# Patient Record
Sex: MALE | Race: WHITE | NOT HISPANIC OR LATINO | Employment: FULL TIME | URBAN - METROPOLITAN AREA
[De-identification: names, ages, dates, MRNs, and addresses within clinical notes are randomized per-mention and may not be internally consistent; named-entity substitution may affect disease eponyms.]

---

## 2019-04-02 ENCOUNTER — OFFICE VISIT (OUTPATIENT)
Dept: FAMILY MEDICINE CLINIC | Facility: CLINIC | Age: 54
End: 2019-04-02

## 2019-04-02 VITALS
TEMPERATURE: 98.4 F | OXYGEN SATURATION: 96 % | SYSTOLIC BLOOD PRESSURE: 126 MMHG | HEART RATE: 121 BPM | DIASTOLIC BLOOD PRESSURE: 88 MMHG | WEIGHT: 296 LBS | BODY MASS INDEX: 40.09 KG/M2 | HEIGHT: 72 IN

## 2019-04-02 DIAGNOSIS — J02.9 ACUTE PHARYNGITIS, UNSPECIFIED ETIOLOGY: ICD-10-CM

## 2019-04-02 DIAGNOSIS — J98.01 BRONCHOSPASM: ICD-10-CM

## 2019-04-02 DIAGNOSIS — J18.9 PNEUMONITIS: Primary | ICD-10-CM

## 2019-04-02 PROCEDURE — 99214 OFFICE O/P EST MOD 30 MIN: CPT | Performed by: INTERNAL MEDICINE

## 2019-04-02 PROCEDURE — 71046 X-RAY EXAM CHEST 2 VIEWS: CPT | Performed by: INTERNAL MEDICINE

## 2019-04-02 RX ORDER — ALBUTEROL SULFATE 90 UG/1
AEROSOL, METERED RESPIRATORY (INHALATION)
COMMUNITY
Start: 2019-03-29

## 2019-04-02 RX ORDER — BUDESONIDE AND FORMOTEROL FUMARATE DIHYDRATE 160; 4.5 UG/1; UG/1
AEROSOL RESPIRATORY (INHALATION)
Qty: 1 INHALER | Refills: 0 | Status: SHIPPED | OUTPATIENT
Start: 2019-04-02

## 2019-04-02 RX ORDER — BUDESONIDE AND FORMOTEROL FUMARATE DIHYDRATE 160; 4.5 UG/1; UG/1
2 AEROSOL RESPIRATORY (INHALATION)
Qty: 1 G | Refills: 12 | Status: SHIPPED | OUTPATIENT
Start: 2019-04-02

## 2019-04-02 RX ORDER — DEXTROMETHORPHAN HYDROBROMIDE AND PROMETHAZINE HYDROCHLORIDE 15; 6.25 MG/5ML; MG/5ML
SYRUP ORAL
Refills: 0 | COMMUNITY
Start: 2019-03-29

## 2019-04-02 RX ORDER — LEVOCETIRIZINE DIHYDROCHLORIDE 5 MG/1
5 TABLET, FILM COATED ORAL EVERY EVENING
COMMUNITY

## 2019-04-02 RX ORDER — PREDNISONE 10 MG/1
10 TABLET ORAL DAILY
Qty: 20 TABLET | Refills: 0 | Status: SHIPPED | OUTPATIENT
Start: 2019-04-02

## 2019-04-02 RX ORDER — DOXYCYCLINE HYCLATE 100 MG/1
CAPSULE ORAL
Refills: 0 | COMMUNITY
Start: 2019-03-29

## 2019-04-02 NOTE — PROGRESS NOTES
Subjective   Mike Suárez is a 54 y.o. male.   Follow-up with probable pneumonia seen in urgent care actually l [facility] without chest x-ray capability.  By auscultation they were concerned about pneumonia and sent him here for further follow-up  History of Present Illness   As above did receive doxycycline clinically is improved at this point energy level is improving no temperature today has not had a chest x-ray hold which will do patient does have an element of wheezing as well.  Some cough not that bothersome for sleep.  He did receive an albuterol inhaler at the [facility] clinic no personal history pneumonia energy is improved from 2 days ago not aware of a temperature.  Very minimal scratchy throat.  No sinus drainage  Patient is a former smoker no contributory family history drug allergies are none  Review of Systems   Respiratory: Positive for cough.    All other systems reviewed and are negative.      Objective   Vitals:    04/02/19 1318   BP: 126/88   Pulse: (!) 121   Temp: 98.4 °F (36.9 °C)   SpO2: 96%   Weight: 134 kg (296 lb)     Physical Exam   Constitutional: He appears well-developed and well-nourished.   HENT:   Head: Normocephalic and atraumatic.   Right Ear: External ear normal.   Left Ear: External ear normal.   Throat with mild inflammation   Cardiovascular: Normal rate, regular rhythm and normal heart sounds.   Pulmonary/Chest: Effort normal.   Patient with mild to moderate wheezes bilaterally few rhonchi heard lower lung fields right more so than left partial clearing with cough.   Abdominal: Soft. Bowel sounds are normal.   Neurological: He is alert.   Unremarkable gait and station   Skin: Skin is warm and dry.   Nursing note and vitals reviewed.      No results found for: INR    Procedures  Peak flows are diminished chest x-ray AP and lateral obtain we have either small infiltrate or air bronchograms on right lower lung field seen almost touching the hemidiaphragm on the right on the PA  view and what appears to be right lower lung field on the lateral view.  Nothing regarding infiltrates on the left.  No other bony or soft tissue normalities are noted.  No comparison available.  Assessment/Plan   1.  Pneumonitis right plan will get formal radiology reading patient is clinically improving doxycycline will continue same for 10-day course follow-up in 10 days time    2.  Bronchospasm plan prednisone 40 mg Ã-2 days, 30 mg Ã-2 days, 20 mg Ã-2 days, 10 mg Ã-2 days.,  Symbicort 162 puffs twice daily patient is let us know this should worsen or go to ER    3.  Pharyngitis continue with doxycycline as we see no clinical response to this point time to complete a 10-day course    Much of this encounter note is an electronic transcription/translation of spoken language to printed text.  The electronic translation of spoken language may permit erroneous, or at times, nonsensical words or phrases to be inadvertently transcribed.  Although I have reviewed the note for such errors, some may still exist. If there are questions or for further clarification, please contact me.

## 2023-04-14 ENCOUNTER — OFFICE VISIT (OUTPATIENT)
Dept: INTERNAL MEDICINE | Facility: CLINIC | Age: 58
End: 2023-04-14
Payer: COMMERCIAL

## 2023-04-14 VITALS
WEIGHT: 292 LBS | OXYGEN SATURATION: 96 % | SYSTOLIC BLOOD PRESSURE: 156 MMHG | HEIGHT: 72 IN | DIASTOLIC BLOOD PRESSURE: 100 MMHG | HEART RATE: 100 BPM | BODY MASS INDEX: 39.55 KG/M2

## 2023-04-14 DIAGNOSIS — Z12.5 SCREENING FOR PROSTATE CANCER: ICD-10-CM

## 2023-04-14 DIAGNOSIS — I10 HYPERTENSION, UNSPECIFIED TYPE: ICD-10-CM

## 2023-04-14 DIAGNOSIS — Z00.00 ENCOUNTER FOR MEDICAL EXAMINATION TO ESTABLISH CARE: ICD-10-CM

## 2023-04-14 DIAGNOSIS — Z12.11 SCREEN FOR COLON CANCER: Primary | ICD-10-CM

## 2023-04-14 DIAGNOSIS — Z11.59 ENCOUNTER FOR HEPATITIS C SCREENING TEST FOR LOW RISK PATIENT: ICD-10-CM

## 2023-04-14 NOTE — PROGRESS NOTES
"Chief Complaint  No chief complaint on file.    Subjective        Mike Suárez presents to [facility]  History of Present Illness  58-year-old male presenting with hypertension and to establish care.  Is a  for [employer].  Both daughters are still at home and age 25 and 22.  Activity includes work and walking dogs at home.  Previously had a carcinoma removed from his leg.  Gallbladder removed in his 40s and has no issues since.  Colonoscopy previously with Dr. Keenan.  Has had rotator cuff surgery in the past.  Cyst has also been removed from his back in the past.  Mother has hypertension.            Objective   Vital Signs:  /100   Pulse 100   Ht 182.2 cm (71.75\")   Wt 132 kg (292 lb)   SpO2 96%   BMI 39.88 kg/m²   Estimated body mass index is 39.88 kg/m² as calculated from the following:    Height as of this encounter: 182.2 cm (71.75\").    Weight as of this encounter: 132 kg (292 lb).             Physical Exam  Vitals reviewed.   Constitutional:       Appearance: Normal appearance.   Cardiovascular:      Rate and Rhythm: Normal rate and regular rhythm.      Pulses: Normal pulses.      Heart sounds: Normal heart sounds.   Pulmonary:      Effort: Pulmonary effort is normal.      Breath sounds: Normal breath sounds.   Musculoskeletal:         General: Normal range of motion.      Cervical back: Normal range of motion.   Skin:     General: Skin is warm and dry.      Capillary Refill: Capillary refill takes less than 2 seconds.   Neurological:      General: No focal deficit present.      Mental Status: He is alert and oriented to person, place, and time.   Psychiatric:         Mood and Affect: Mood normal.         Behavior: Behavior normal.         Thought Content: Thought content normal.         Judgment: Judgment normal.        Result Review :        Current Outpatient Medications on File Prior to Visit   Medication Sig Dispense Refill   • levocetirizine (XYZAL) 5 MG " tablet Take 1 tablet by mouth Every Evening.     • Multiple Vitamin (MULTI VITAMIN DAILY PO) Take 1 tablet by mouth.     • vitamin C (ASCORBIC ACID) 500 MG tablet Take 1 tablet by mouth Daily.     • Lutein-Zeaxanthin 15-4.75 MG capsule Take  by mouth Daily.       No current facility-administered medications on file prior to visit.                    Assessment and Plan   Diagnoses and all orders for this visit:    1. Screen for colon cancer (Primary)  -     Ambulatory Referral For Screening Colonoscopy    2. Encounter for hepatitis C screening test for low risk patient  -     Hepatitis C Antibody; Future    3. Encounter for medical examination to establish care  -     Comprehensive Metabolic Panel; Future  -     Urinalysis With Microscopic If Indicated (No Culture) - Urine, Clean Catch; Future  -     TSH Rfx On Abnormal To Free T4; Future  -     Lipid Panel With / Chol / HDL Ratio; Future  -     CBC & Differential; Future    4. Screening for prostate cancer  -     PSA Screen; Future    5. Hypertension, unspecified type      Patient Instructions   Purchase a blood pressure cuff and monitor daily at home. Record and bring to next appointment. Limit salt intake. Stay hydrated with water. Limit caffeine. Increase physical activity as tolerated. Labs in future. Follow-up in 2 months for annual.           Follow Up   Return in about 2 months (around 6/14/2023) for Annual physical.  Patient was given instructions and counseling regarding his condition or for health maintenance advice. Please see specific information pulled into the AVS if appropriate.

## 2023-04-14 NOTE — PATIENT INSTRUCTIONS
Purchase a blood pressure cuff and monitor daily at home. Record and bring to next appointment. Limit salt intake. Stay hydrated with water. Limit caffeine. Increase physical activity as tolerated. Labs in future. Follow-up in 2 months for annual.

## 2023-04-20 ENCOUNTER — PATIENT ROUNDING (BHMG ONLY) (OUTPATIENT)
Dept: INTERNAL MEDICINE | Facility: CLINIC | Age: 58
End: 2023-04-20
Payer: COMMERCIAL

## 2023-05-03 ENCOUNTER — TELEPHONE (OUTPATIENT)
Dept: GASTROENTEROLOGY | Facility: CLINIC | Age: 58
End: 2023-05-03
Payer: COMMERCIAL

## 2023-05-03 ENCOUNTER — PRE-PROCEDURE SCREENING (OUTPATIENT)
Dept: GASTROENTEROLOGY | Facility: CLINIC | Age: 58
End: 2023-05-03
Payer: COMMERCIAL

## 2023-05-03 NOTE — TELEPHONE ENCOUNTER
Hub staff attempted to follow warm transfer process and was unsuccessful     Caller: Mike Suárez    Relationship to patient: Self    Best call back number: [phone]    Patient is needing: PT RETURNING PHONE CALL FROM    Alana Gibbs RegSched Rep     PT STATES HE DOES NOT KNOW WHEN THE DATE OF HIS LAST SCOPE WAS AS IT HAS BEEN OVER 15 YEARS PLEASE CALL PT BACK

## 2023-05-04 NOTE — TELEPHONE ENCOUNTER
LAST SCOPE 15YRS ( NO RECORDS) --Personal history of polyps--No family  history  of polyps--Family history of colon cancer--No blood thinners--Medications:              levocetirizine (XYZAL) 5 MG tablet  Lutein-Zeaxanthin 15-4.75 MG capsule  Multiple Vitamin (MULTI VITAMIN DAILY PO)  vitamin C (ASCORBIC ACID) 500 MG tablet         QUESTIONNAIRE SCREENING  SCAN IN  MEDIA & HAS BEEN SENT TO DOCTOR FOR REVIEW

## 2023-05-19 ENCOUNTER — TELEPHONE (OUTPATIENT)
Dept: GASTROENTEROLOGY | Facility: CLINIC | Age: 58
End: 2023-05-19

## 2023-05-19 NOTE — TELEPHONE ENCOUNTER
Hub staff attempted to follow warm transfer process and was unsuccessful     Caller: Daphne Suárez    Relationship to patient: Emergency Contact    Best call back number: [phone]    Patient is needing: PATIENT IS CALLING TO SCHEDULE SCOPE. PLEASE CALL BACK AS SOON AS POSSIBLE.  LEAVE VM IF PATIENT DOESN'T ANSWER.

## 2023-05-24 NOTE — TELEPHONE ENCOUNTER
Hub staff attempted to follow warm transfer process and was unsuccessful     Caller: Daphne Suárez    Relationship to patient: Emergency Contact    Best call back number: [phone]    Patient is needing: RETURNING MISSED CALL TO SCHEDULE SCOPE

## 2023-05-29 ENCOUNTER — PREP FOR SURGERY (OUTPATIENT)
Dept: OTHER | Facility: HOSPITAL | Age: 58
End: 2023-05-29

## 2023-05-29 DIAGNOSIS — K63.5 COLON POLYP: Primary | ICD-10-CM

## 2023-06-01 PROBLEM — K63.5 COLON POLYP: Status: ACTIVE | Noted: 2023-06-01

## 2023-10-06 ENCOUNTER — TELEPHONE (OUTPATIENT)
Dept: INTERNAL MEDICINE | Facility: CLINIC | Age: 58
End: 2023-10-06

## 2023-10-06 NOTE — TELEPHONE ENCOUNTER
Caller: Mike Suárez    Relationship: Self    Best call back number: [phone]     What is the best time to reach you: ANY     Who are you requesting to speak with (clinical staff, provider,  specific staff member): JOSEFINA MURILLO       What was the call regarding: PATIENT STATES HE HAS BEEN EXPERIENCING DIARRHEA SINCE SUNDAY MONDAY HE WENT TO WORK AND LEFT WORK EARLY BECAUSE HE WAS ACHY AND CAME HOME AND SLEPT   PATIENT STATES TUESDAY HE WAS BETTER AND WORKED A FULL SHIFT WITH A SMALL AMOUNT OF DIARRHEA NIGHT   PATIENT STATES WEDNESDAY HE  HAD SOME MORE DIARRHEA AND WAS FATIGUE PATIENT STATES ON THURSDAY HE WAS ABLE TO WORK A FULL SHIFT WITH A LITTLE DIARRHEA AND YESTERDAY HE WAS ABLE TO WORK AGAIN BUT THE DIARRHEA GOT WORSE OVER NIGHT    PATIENT STATES HE IS CONCERNED AND WOULD LIKE TO KNOW WHAT JOSEFINA MURILLO ADVISE      Is it okay if the provider responds through MyChart: NO

## 2023-10-06 NOTE — TELEPHONE ENCOUNTER
Recommend staying hydrated with fluids and having occasional Gatorade to help replenish electrolytes.  Recommend eating easily digestible foods such as broth soups, Jell-O, crackers, bananas and applesauce.  Consider taking Imodium when at work or if leaving the home to help prevent diarrhea episodes.  If symptoms persist over the weekend, recommend scheduling a visit for next week.    If severe abdominal pain, blood in stool or inability to tolerate food and fluids occurs, then go to ER.

## 2023-10-10 ENCOUNTER — OFFICE VISIT (OUTPATIENT)
Dept: INTERNAL MEDICINE | Facility: CLINIC | Age: 58
End: 2023-10-10
Payer: COMMERCIAL

## 2023-10-10 VITALS
SYSTOLIC BLOOD PRESSURE: 148 MMHG | WEIGHT: 244.4 LBS | HEART RATE: 80 BPM | OXYGEN SATURATION: 98 % | HEIGHT: 72 IN | BODY MASS INDEX: 33.1 KG/M2 | DIASTOLIC BLOOD PRESSURE: 88 MMHG

## 2023-10-10 DIAGNOSIS — R19.7 DIARRHEA, UNSPECIFIED TYPE: Primary | ICD-10-CM

## 2023-10-10 PROCEDURE — 99213 OFFICE O/P EST LOW 20 MIN: CPT

## 2023-10-10 NOTE — PATIENT INSTRUCTIONS
Continue diet. Add more solid foods at dinner. Stay hydrated with water. If symptoms not improved by Thursday, notify office. Will prescribe Flagyl if symptoms not improved. Follow-up as needed.

## 2023-10-10 NOTE — PROGRESS NOTES
"Chief Complaint  Diarrhea    Subjective        Mike Suárez presents to [facility]  History of Present Illness  58-year-old male presenting with diarrhea.  Symptoms started about 10 days ago.  Has had watery diarrhea multiple times a day.  He is able to sense when he needs to go.  Has avoided any incontinence.  Denies blood in stool.  Denies malodorous stool.  Diarrhea has varied in color.  Is able to tolerate food and liquids.  Denies nausea or vomiting.  Denies fever and abdominal pain.  Has been having mostly liquid diet as well as BRAT diet.  Last night was the first regular meal that he is experienced.  Had baked chicken and cooked vegetables.  Tolerated well.  This morning had a semiloose stool instead of watery stool.  Diarrhea         Objective   Vital Signs:  /88 (BP Location: Left arm, Patient Position: Sitting, Cuff Size: Large Adult)   Pulse 80   Ht 182.2 cm (71.73\")   Wt 111 kg (244 lb 6.4 oz)   SpO2 98%   BMI 33.39 kg/mý   Estimated body mass index is 33.39 kg/mý as calculated from the following:    Height as of this encounter: 182.2 cm (71.73\").    Weight as of this encounter: 111 kg (244 lb 6.4 oz).               Physical Exam  Vitals reviewed.   Constitutional:       Appearance: Normal appearance.   Abdominal:      General: Bowel sounds are normal.      Palpations: Abdomen is soft.   Musculoskeletal:         General: Normal range of motion.      Cervical back: Normal range of motion.   Skin:     General: Skin is warm and dry.      Capillary Refill: Capillary refill takes less than 2 seconds.   Neurological:      General: No focal deficit present.      Mental Status: He is alert and oriented to person, place, and time.   Psychiatric:         Mood and Affect: Mood normal.         Behavior: Behavior normal.         Thought Content: Thought content normal.         Judgment: Judgment normal.        Result Review :    Common labs          6/9/2023    09:00 "   Common Labs   Glucose 96    BUN 14    Creatinine 0.84    Sodium 141    Potassium 4.7    Chloride 103    Calcium 10.3    Total Protein 7.1    Albumin 4.5    Total Bilirubin 0.5    Alkaline Phosphatase 67    AST (SGOT) 19    ALT (SGPT) 52    WBC 4.45    Hemoglobin 16.6    Hematocrit 47.9    Platelets 200    Total Cholesterol 228    Triglycerides 128    HDL Cholesterol 45    LDL Cholesterol  160    PSA 1.030          Current Outpatient Medications on File Prior to Visit   Medication Sig Dispense Refill    levocetirizine (XYZAL) 5 MG tablet Take 1 tablet by mouth Every Evening.      Lutein-Zeaxanthin 15-4.75 MG capsule Take  by mouth Daily.      Multiple Vitamin (MULTI VITAMIN DAILY PO) Take 1 tablet by mouth.      vitamin C (ASCORBIC ACID) 500 MG tablet Take 1 tablet by mouth Daily.       No current facility-administered medications on file prior to visit.              Assessment and Plan   Diagnoses and all orders for this visit:    1. Diarrhea, unspecified type (Primary)      Patient Instructions   Continue diet. Add more solid foods at dinner. Stay hydrated with water. If symptoms not improved by Thursday, notify office. Will prescribe Flagyl if symptoms not improved. Follow-up as needed.          Follow Up   No follow-ups on file.  Patient was given instructions and counseling regarding his condition or for health maintenance advice. Please see specific information pulled into the AVS if appropriate.

## 2023-12-01 ENCOUNTER — ANESTHESIA (OUTPATIENT)
Dept: GASTROENTEROLOGY | Facility: HOSPITAL | Age: 58
End: 2023-12-01
Payer: COMMERCIAL

## 2023-12-01 ENCOUNTER — ANESTHESIA EVENT (OUTPATIENT)
Dept: GASTROENTEROLOGY | Facility: HOSPITAL | Age: 58
End: 2023-12-01
Payer: COMMERCIAL

## 2023-12-01 ENCOUNTER — HOSPITAL ENCOUNTER (OUTPATIENT)
Facility: HOSPITAL | Age: 58
Setting detail: HOSPITAL OUTPATIENT SURGERY
Discharge: HOME | End: 2023-12-01
Attending: INTERNAL MEDICINE | Admitting: INTERNAL MEDICINE
Payer: COMMERCIAL

## 2023-12-01 VITALS
SYSTOLIC BLOOD PRESSURE: 112 MMHG | WEIGHT: 230 LBS | HEIGHT: 72 IN | HEART RATE: 76 BPM | DIASTOLIC BLOOD PRESSURE: 75 MMHG | OXYGEN SATURATION: 100 % | BODY MASS INDEX: 31.15 KG/M2 | RESPIRATION RATE: 16 BRPM

## 2023-12-01 DIAGNOSIS — K63.5 COLON POLYP: ICD-10-CM

## 2023-12-01 PROCEDURE — 25010000002 PROPOFOL 10 MG/ML EMULSION: Performed by: ANESTHESIOLOGY

## 2023-12-01 PROCEDURE — 45385 COLONOSCOPY W/LESION REMOVAL: CPT | Performed by: INTERNAL MEDICINE

## 2023-12-01 PROCEDURE — 45380 COLONOSCOPY AND BIOPSY: CPT | Performed by: INTERNAL MEDICINE

## 2023-12-01 PROCEDURE — 25810000003 LACTATED RINGERS PER 1000 ML: Performed by: INTERNAL MEDICINE

## 2023-12-01 PROCEDURE — 88305 TISSUE EXAM BY PATHOLOGIST: CPT | Performed by: INTERNAL MEDICINE

## 2023-12-01 RX ORDER — PROPOFOL 10 MG/ML
VIAL (ML) INTRAVENOUS CONTINUOUS PRN
Status: DISCONTINUED | OUTPATIENT
Start: 2023-12-01 | End: 2023-12-01

## 2023-12-01 RX ORDER — PROPOFOL 10 MG/ML
VIAL (ML) INTRAVENOUS AS NEEDED
Status: DISCONTINUED | OUTPATIENT
Start: 2023-12-01 | End: 2023-12-01 | Stop reason: SURG

## 2023-12-01 RX ORDER — LIDOCAINE HYDROCHLORIDE 20 MG/ML
INJECTION, SOLUTION INFILTRATION; PERINEURAL AS NEEDED
Status: DISCONTINUED | OUTPATIENT
Start: 2023-12-01 | End: 2023-12-01 | Stop reason: SURG

## 2023-12-01 RX ORDER — SODIUM CHLORIDE, SODIUM LACTATE, POTASSIUM CHLORIDE, CALCIUM CHLORIDE 600; 310; 30; 20 MG/100ML; MG/100ML; MG/100ML; MG/100ML
30 INJECTION, SOLUTION INTRAVENOUS CONTINUOUS PRN
Status: DISCONTINUED | OUTPATIENT
Start: 2023-12-01 | End: 2023-12-01 | Stop reason: HOSPADM

## 2023-12-01 RX ADMIN — PROPOFOL 200 MG: 10 INJECTION, EMULSION INTRAVENOUS at 08:30

## 2023-12-01 RX ADMIN — SODIUM CHLORIDE, POTASSIUM CHLORIDE, SODIUM LACTATE AND CALCIUM CHLORIDE 30 ML/HR: 600; 310; 30; 20 INJECTION, SOLUTION INTRAVENOUS at 08:24

## 2023-12-01 RX ADMIN — PROPOFOL 200 MCG/KG/MIN: 10 INJECTION, EMULSION INTRAVENOUS at 08:34

## 2023-12-01 RX ADMIN — LIDOCAINE HYDROCHLORIDE 100 MG: 20 INJECTION, SOLUTION INFILTRATION; PERINEURAL at 08:31

## 2023-12-01 NOTE — DISCHARGE INSTRUCTIONS
For the next 24 hours patient needs to be with a responsible adult.    For 24 hours DO NOT drive, operate machinery, appliances, drink alcohol, make important decisions or sign legal documents.    Start with a light or bland diet if you are feeling sick to your stomach otherwise advance to regular diet as tolerated.    Follow recommendations on procedure report if provided by your doctor.    Call Dr Keenan for problems [phone]    Problems may include but not limited to: large amounts of bleeding, trouble breathing, repeated vomiting, severe unrelieved pain, fever or chills.

## 2023-12-01 NOTE — ANESTHESIA POSTPROCEDURE EVALUATION
"Patient: Mike Suárez    Procedure Summary       Date: 12/01/23 Room / Location:  [facility]    Anesthesia Start: 0826 Anesthesia Stop: 0855    Procedure: COLONOSCOPY to cecum with cold biopsy polypectomies Diagnosis:       Colon polyp      (Colon polyp [K63.5])    Surgeons: Mike Keenan MD Provider: Umang Garcia MD    Anesthesia Type: MAC ASA Status: 2            Anesthesia Type: MAC    Vitals  Vitals Value Taken Time   /75 12/01/23 0914   Temp     Pulse 75 12/01/23 0914   Resp 16 12/01/23 0913   SpO2 97 % 12/01/23 0914   Vitals shown include unfiled device data.        Post Anesthesia Care and Evaluation    Patient location during evaluation: bedside  Patient participation: complete - patient participated  Level of consciousness: sleepy but conscious  Pain score: 0  Pain management: adequate    Airway patency: patent  Anesthetic complications: No anesthetic complications    Cardiovascular status: acceptable  Respiratory status: acceptable  Hydration status: acceptable    Comments: /75 (BP Location: Right arm, Patient Position: Lying)   Pulse 76   Resp 16   Ht 182.9 cm (72\")   Wt 104 kg (230 lb)   SpO2 100%   BMI 31.19 kg/m²       "

## 2023-12-01 NOTE — H&P
"[facility]  Pre Procedure History & Physical    Chief Complaint:   Time for my colonoscopy    Subjective     HPI:   58 y.o. male here for a screening colonoscopy.  He does have a history of colon polyps taken out many years ago.  His last colonoscopy was many years ago.    Past Medical History:   Past Medical History:   Diagnosis Date    Cancer     Basal cell carcinoma, lower left leg    Cholelithiasis     Colon polyp     MRSA (methicillin resistant Staphylococcus aureus) carrier     Pneumonia        Family History:  Family History   Problem Relation Age of Onset    Heart disease Maternal Grandfather        Social History:   reports that he quit smoking about 32 years ago. His smoking use included cigarettes. He started smoking about 37 years ago. He has a 1.25 pack-year smoking history. He has been exposed to tobacco smoke. He has never used smokeless tobacco. He reports current alcohol use of about 6.0 standard drinks of alcohol per week. He reports that he does not use drugs.    Medications:   Medications Prior to Admission   Medication Sig Dispense Refill Last Dose    levocetirizine (XYZAL) 5 MG tablet Take 1 tablet by mouth Every Evening.   Past Week    Lutein-Zeaxanthin 15-4.75 MG capsule Take  by mouth Daily.   Past Week    Multiple Vitamin (MULTI VITAMIN DAILY PO) Take 1 tablet by mouth.   Past Week    vitamin C (ASCORBIC ACID) 500 MG tablet Take 1 tablet by mouth Daily.   Past Week       Allergies:  Patient has no known allergies.    ROS:    Pertinent items are noted in HPI     Objective     Blood pressure 151/100, pulse 98, resp. rate 16, height 182.9 cm (72\"), weight 104 kg (230 lb), SpO2 97%.    Physical Exam   Constitutional: Pt is oriented to person, place, and time and well-developed, well-nourished, and in no distress.   HENT:   Mouth/Throat: Oropharynx is clear and moist.   Neck: Normal range of motion. Neck supple.   Cardiovascular: Normal rate, regular rhythm and normal " heart sounds.    Pulmonary/Chest: Effort normal and breath sounds normal. No respiratory distress. No  wheezes.   Abdominal: Soft. Bowel sounds are normal.   Skin: Skin is warm and dry.   Psychiatric: Mood, memory, affect and judgment normal.     Assessment & Plan     Diagnosis:  58 y.o. male here for a screening colonoscopy.  He does have a history of colon polyps taken out many years ago.  His last colonoscopy was many years ago.    Anticipated Surgical Procedure:  Colonoscopy    The risks, benefits, and alternatives of this procedure have been discussed with the patient or the responsible party- the patient understands and agrees to proceed.    Mike Keenan M.D.

## 2023-12-01 NOTE — ANESTHESIA PREPROCEDURE EVALUATION
Anesthesia Evaluation     Patient summary reviewed and Nursing notes reviewed   NPO Solid Status: > 8 hours  NPO Liquid Status: > 4 hours           Airway   Mallampati: II  Neck ROM: full  No difficulty expected  Dental - normal exam     Pulmonary     breath sounds clear to auscultation  (+) a smoker Former,  Cardiovascular         Neuro/Psych  GI/Hepatic/Renal/Endo      Musculoskeletal     Abdominal    Substance History      OB/GYN          Other      history of cancer                      Anesthesia Plan    ASA 2     MAC     intravenous induction     Anesthetic plan, risks, benefits, and alternatives have been provided, discussed and informed consent has been obtained with: patient.        CODE STATUS:

## 2023-12-04 LAB
LAB AP CASE REPORT: NORMAL
PATH REPORT.FINAL DX SPEC: NORMAL
PATH REPORT.GROSS SPEC: NORMAL

## 2023-12-11 ENCOUNTER — TELEPHONE (OUTPATIENT)
Dept: GASTROENTEROLOGY | Facility: CLINIC | Age: 58
End: 2023-12-11
Payer: COMMERCIAL

## 2023-12-11 NOTE — TELEPHONE ENCOUNTER
Called pt and left  for pt to call back.     C/s placed in [facility] and  for 12/01/2028.    Results sent to pcp thru [facility].

## 2023-12-11 NOTE — TELEPHONE ENCOUNTER
----- Message from Mike Keenan MD sent at 12/10/2023  9:08 PM EST -----  12/10/23       Tell him that the colon polyps that were removed were not cancerous but at least one polyp was precancerous. I recommend a repeat colonoscopy in 5 yrs. Send a copy of this report to his PCP.   Thx. kjh

## 2023-12-11 NOTE — PROGRESS NOTES
12/10/23       Tell him that the colon polyps that were removed were not cancerous but at least one polyp was precancerous. I recommend a repeat colonoscopy in 5 yrs. Send a copy of this report to his PCP.   Ela jin

## 2023-12-11 NOTE — TELEPHONE ENCOUNTER
Hub staff attempted to follow warm transfer process and was unsuccessful     Caller: Mike Suárez    Relationship to patient: Self    Best call back number: [phone]     Patient is needing: PT IS RETURNING PHONE CALL FROM JARRELL PIZANO RN IN REGARDS TO RESULTS. PLEASE CALL PT BACK AND ADVISE

## 2023-12-28 ENCOUNTER — OFFICE VISIT (OUTPATIENT)
Dept: INTERNAL MEDICINE | Facility: CLINIC | Age: 58
End: 2023-12-28
Payer: COMMERCIAL

## 2023-12-28 VITALS
SYSTOLIC BLOOD PRESSURE: 134 MMHG | HEIGHT: 72 IN | OXYGEN SATURATION: 98 % | DIASTOLIC BLOOD PRESSURE: 80 MMHG | BODY MASS INDEX: 31.29 KG/M2 | WEIGHT: 231 LBS | HEART RATE: 84 BPM

## 2023-12-28 DIAGNOSIS — M25.50 ARTHRALGIA, UNSPECIFIED JOINT: Primary | ICD-10-CM

## 2023-12-28 DIAGNOSIS — I10 HYPERTENSION, UNSPECIFIED TYPE: ICD-10-CM

## 2023-12-28 DIAGNOSIS — E78.5 HYPERLIPIDEMIA, UNSPECIFIED HYPERLIPIDEMIA TYPE: ICD-10-CM

## 2023-12-28 LAB
ALBUMIN SERPL-MCNC: 5 G/DL (ref 3.5–5.2)
ALBUMIN/GLOB SERPL: 2.2 G/DL
ALP SERPL-CCNC: 71 U/L (ref 39–117)
ALT SERPL-CCNC: 21 U/L (ref 1–41)
AST SERPL-CCNC: 14 U/L (ref 1–40)
BILIRUB SERPL-MCNC: 0.6 MG/DL (ref 0–1.2)
BUN SERPL-MCNC: 12 MG/DL (ref 6–20)
BUN/CREAT SERPL: 13.6 (ref 7–25)
CALCIUM SERPL-MCNC: 9.7 MG/DL (ref 8.6–10.5)
CHLORIDE SERPL-SCNC: 101 MMOL/L (ref 98–107)
CHOLEST SERPL-MCNC: 224 MG/DL (ref 0–200)
CHOLEST/HDLC SERPL: 3.29 {RATIO}
CO2 SERPL-SCNC: 29.6 MMOL/L (ref 22–29)
CREAT SERPL-MCNC: 0.88 MG/DL (ref 0.76–1.27)
EGFRCR SERPLBLD CKD-EPI 2021: 99.1 ML/MIN/1.73
ERYTHROCYTE [DISTWIDTH] IN BLOOD BY AUTOMATED COUNT: 12.4 % (ref 12.3–15.4)
GLOBULIN SER CALC-MCNC: 2.3 GM/DL
GLUCOSE SERPL-MCNC: 92 MG/DL (ref 65–99)
HCT VFR BLD AUTO: 45.1 % (ref 37.5–51)
HDLC SERPL-MCNC: 68 MG/DL (ref 40–60)
HGB BLD-MCNC: 15.4 G/DL (ref 13–17.7)
LDLC SERPL CALC-MCNC: 143 MG/DL (ref 0–100)
MCH RBC QN AUTO: 31.4 PG (ref 26.6–33)
MCHC RBC AUTO-ENTMCNC: 34.1 G/DL (ref 31.5–35.7)
MCV RBC AUTO: 91.9 FL (ref 79–97)
PLATELET # BLD AUTO: 213 10*3/MM3 (ref 140–450)
POTASSIUM SERPL-SCNC: 4.8 MMOL/L (ref 3.5–5.2)
PROT SERPL-MCNC: 7.3 G/DL (ref 6–8.5)
RBC # BLD AUTO: 4.91 10*6/MM3 (ref 4.14–5.8)
SODIUM SERPL-SCNC: 142 MMOL/L (ref 136–145)
TRIGL SERPL-MCNC: 74 MG/DL (ref 0–150)
VLDLC SERPL CALC-MCNC: 13 MG/DL (ref 5–40)
WBC # BLD AUTO: 5.52 10*3/MM3 (ref 3.4–10.8)

## 2023-12-28 PROCEDURE — 99214 OFFICE O/P EST MOD 30 MIN: CPT

## 2023-12-28 RX ORDER — CALCIUM POLYCARBOPHIL 625 MG
TABLET ORAL
COMMUNITY

## 2023-12-28 RX ORDER — AMOXICILLIN 500 MG
CAPSULE ORAL
COMMUNITY

## 2023-12-28 RX ORDER — DICLOFENAC POTASSIUM 50 MG/1
50 TABLET, FILM COATED ORAL DAILY PRN
Qty: 90 TABLET | Refills: 1 | Status: SHIPPED | OUTPATIENT
Start: 2023-12-28

## 2023-12-28 NOTE — PATIENT INSTRUCTIONS
Take diclofenac daily as needed for joint pain. Stay hydrated with water. Stay active. Labs today. Follow-up in 6 months for annual and fasting labs.

## 2023-12-28 NOTE — PROGRESS NOTES
"Chief Complaint  Follow-up    Subjective        Mike Suárez presents to [facility]  History of Present Illness  58-year-old male presenting for hypertension hyperlipidemia follow-up.  Recent colonoscopy shows precancerous polyps.  Recommendation of colonoscopy in 5 years.  Has been experiencing neck ache and upper shoulder pain.  Also has problems with his thumbs.  Thinks it could be arthritis.  Has been seen by our physical therapy but has not improved.  States he is improved his diet by eliminating salt and caffeine since April 2023.      Objective   Vital Signs:  /80 (BP Location: Right arm, Patient Position: Sitting, Cuff Size: Adult)   Pulse 84   Ht 182.9 cm (72\")   Wt 105 kg (231 lb)   SpO2 98%   BMI 31.33 kg/m²   Estimated body mass index is 31.33 kg/m² as calculated from the following:    Height as of this encounter: 182.9 cm (72\").    Weight as of this encounter: 105 kg (231 lb).               Physical Exam  Vitals reviewed.   Constitutional:       Appearance: Normal appearance.   Musculoskeletal:         General: Normal range of motion.      Cervical back: Normal range of motion. Tenderness present.   Skin:     General: Skin is warm and dry.      Capillary Refill: Capillary refill takes less than 2 seconds.   Neurological:      General: No focal deficit present.      Mental Status: He is alert and oriented to person, place, and time.   Psychiatric:         Mood and Affect: Mood normal.         Behavior: Behavior normal.         Thought Content: Thought content normal.         Judgment: Judgment normal.        Result Review :    Common labs          6/9/2023    09:00 12/28/2023    13:39   Common Labs   Glucose 96  92    BUN 14  12    Creatinine 0.84  0.88    Sodium 141  142    Potassium 4.7  4.8    Chloride 103  101    Calcium 10.3  9.7    Total Protein 7.1  7.3    Albumin 4.5  5.0    Total Bilirubin 0.5  0.6    Alkaline Phosphatase 67  71    AST (SGOT) 19  14  "   ALT (SGPT) 52  21    WBC 4.45  5.52    Hemoglobin 16.6  15.4    Hematocrit 47.9  45.1    Platelets 200  213    Total Cholesterol 228  224    Triglycerides 128  74    HDL Cholesterol 45  68    LDL Cholesterol  160  143    PSA 1.030           Current Outpatient Medications on File Prior to Visit   Medication Sig Dispense Refill    levocetirizine (XYZAL) 5 MG tablet Take 1 tablet by mouth Every Evening.      Lutein-Zeaxanthin 15-4.75 MG capsule Take  by mouth Daily.      Multiple Vitamin (MULTI VITAMIN DAILY PO) Take 1 tablet by mouth.      Omega-3 Fatty Acids (fish oil) 1200 MG capsule capsule       polycarbophil (calcium polycarbophil) 625 MG tablet tablet       vitamin C (ASCORBIC ACID) 500 MG tablet Take 1 tablet by mouth Daily.       No current facility-administered medications on file prior to visit.              Assessment and Plan   Diagnoses and all orders for this visit:    1. Arthralgia, unspecified joint (Primary)  -     diclofenac (CATAFLAM) 50 MG tablet; Take 1 tablet by mouth Daily As Needed (mild pain).  Dispense: 90 tablet; Refill: 1    2. Hypertension, unspecified type  -     Comprehensive Metabolic Panel  -     CBC (No Diff)  -     Lipid Panel With / Chol / HDL Ratio    3. Hyperlipidemia, unspecified hyperlipidemia type  -     Lipid Panel With / Chol / HDL Ratio      Patient Instructions   Take diclofenac daily as needed for joint pain. Stay hydrated with water. Stay active. Labs today. Follow-up in 6 months for annual and fasting labs.          Follow Up   Return in about 6 months (around 6/28/2024) for Annual physical.  Patient was given instructions and counseling regarding his condition or for health maintenance advice. Please see specific information pulled into the AVS if appropriate.

## 2024-01-02 NOTE — PROGRESS NOTES
CBC is within normal limits.  No signs of infection or anemia.  Metabolic panel is unremarkable.  No signs of kidney injury, liver injury or diabetes.  LDL (bad cholesterol) is elevated 143.  This is an improvement from 6 months ago.  Previous level 160.  Goal of 100 or lower.  Recommend continuing omega-3 (fish oil) supplement daily.  Limit fried foods, red meats and high fat dairy products.  Stay hydrated with water.  Increase heart elevating.  Activity as tolerated

## 2024-06-28 ENCOUNTER — OFFICE VISIT (OUTPATIENT)
Dept: INTERNAL MEDICINE | Facility: CLINIC | Age: 59
End: 2024-06-28
Payer: COMMERCIAL

## 2024-06-28 VITALS
DIASTOLIC BLOOD PRESSURE: 106 MMHG | HEART RATE: 73 BPM | SYSTOLIC BLOOD PRESSURE: 146 MMHG | BODY MASS INDEX: 32.1 KG/M2 | HEIGHT: 72 IN | WEIGHT: 237 LBS | TEMPERATURE: 98.6 F | OXYGEN SATURATION: 96 %

## 2024-06-28 DIAGNOSIS — Z12.5 SCREENING FOR MALIGNANT NEOPLASM OF PROSTATE: Primary | ICD-10-CM

## 2024-06-28 DIAGNOSIS — Z00.00 ANNUAL PHYSICAL EXAM: ICD-10-CM

## 2024-06-28 LAB
ALBUMIN SERPL-MCNC: 4.9 G/DL (ref 3.5–5.2)
ALBUMIN/GLOB SERPL: 2 G/DL
ALP SERPL-CCNC: 61 U/L (ref 39–117)
ALT SERPL-CCNC: 18 U/L (ref 1–41)
APPEARANCE UR: CLEAR
AST SERPL-CCNC: 18 U/L (ref 1–40)
BACTERIA #/AREA URNS HPF: ABNORMAL /HPF
BASOPHILS # BLD AUTO: 0.05 10*3/MM3 (ref 0–0.2)
BASOPHILS NFR BLD AUTO: 0.9 % (ref 0–1.5)
BILIRUB SERPL-MCNC: 0.6 MG/DL (ref 0–1.2)
BILIRUB UR QL STRIP: NEGATIVE
BUN SERPL-MCNC: 11 MG/DL (ref 8–23)
BUN/CREAT SERPL: 12.5 (ref 7–25)
CALCIUM SERPL-MCNC: 9.8 MG/DL (ref 8.6–10.5)
CASTS URNS MICRO: ABNORMAL
CHLORIDE SERPL-SCNC: 102 MMOL/L (ref 98–107)
CHOLEST SERPL-MCNC: 239 MG/DL (ref 0–200)
CHOLEST/HDLC SERPL: 3.23 {RATIO}
CO2 SERPL-SCNC: 29.9 MMOL/L (ref 22–29)
COLOR UR: YELLOW
CREAT SERPL-MCNC: 0.88 MG/DL (ref 0.76–1.27)
EGFRCR SERPLBLD CKD-EPI 2021: 98.4 ML/MIN/1.73
EOSINOPHIL # BLD AUTO: 0.08 10*3/MM3 (ref 0–0.4)
EOSINOPHIL NFR BLD AUTO: 1.5 % (ref 0.3–6.2)
EPI CELLS #/AREA URNS HPF: ABNORMAL /HPF
ERYTHROCYTE [DISTWIDTH] IN BLOOD BY AUTOMATED COUNT: 11.7 % (ref 12.3–15.4)
GLOBULIN SER CALC-MCNC: 2.5 GM/DL
GLUCOSE SERPL-MCNC: 100 MG/DL (ref 65–99)
GLUCOSE UR QL STRIP: NEGATIVE
HCT VFR BLD AUTO: 48.4 % (ref 37.5–51)
HDLC SERPL-MCNC: 74 MG/DL (ref 40–60)
HGB BLD-MCNC: 16.6 G/DL (ref 13–17.7)
HGB UR QL STRIP: NEGATIVE
IMM GRANULOCYTES # BLD AUTO: 0.01 10*3/MM3 (ref 0–0.05)
IMM GRANULOCYTES NFR BLD AUTO: 0.2 % (ref 0–0.5)
KETONES UR QL STRIP: ABNORMAL
LDLC SERPL CALC-MCNC: 153 MG/DL (ref 0–100)
LEUKOCYTE ESTERASE UR QL STRIP: ABNORMAL
LYMPHOCYTES # BLD AUTO: 1.77 10*3/MM3 (ref 0.7–3.1)
LYMPHOCYTES NFR BLD AUTO: 33.3 % (ref 19.6–45.3)
MCH RBC QN AUTO: 31.6 PG (ref 26.6–33)
MCHC RBC AUTO-ENTMCNC: 34.3 G/DL (ref 31.5–35.7)
MCV RBC AUTO: 92.2 FL (ref 79–97)
MONOCYTES # BLD AUTO: 0.53 10*3/MM3 (ref 0.1–0.9)
MONOCYTES NFR BLD AUTO: 10 % (ref 5–12)
NEUTROPHILS # BLD AUTO: 2.87 10*3/MM3 (ref 1.7–7)
NEUTROPHILS NFR BLD AUTO: 54.1 % (ref 42.7–76)
NITRITE UR QL STRIP: NEGATIVE
NRBC BLD AUTO-RTO: 0 /100 WBC (ref 0–0.2)
PH UR STRIP: 5.5 [PH] (ref 5–8)
PLATELET # BLD AUTO: 217 10*3/MM3 (ref 140–450)
POTASSIUM SERPL-SCNC: 4.4 MMOL/L (ref 3.5–5.2)
PROT SERPL-MCNC: 7.4 G/DL (ref 6–8.5)
PROT UR QL STRIP: NEGATIVE
PSA SERPL-MCNC: 1.1 NG/ML (ref 0–4)
RBC # BLD AUTO: 5.25 10*6/MM3 (ref 4.14–5.8)
RBC #/AREA URNS HPF: ABNORMAL /HPF
SODIUM SERPL-SCNC: 143 MMOL/L (ref 136–145)
SP GR UR STRIP: 1.02 (ref 1–1.03)
TRIGL SERPL-MCNC: 71 MG/DL (ref 0–150)
TSH SERPL DL<=0.005 MIU/L-ACNC: 1.45 UIU/ML (ref 0.27–4.2)
UROBILINOGEN UR STRIP-MCNC: ABNORMAL MG/DL
VLDLC SERPL CALC-MCNC: 12 MG/DL (ref 5–40)
WBC # BLD AUTO: 5.31 10*3/MM3 (ref 3.4–10.8)
WBC #/AREA URNS HPF: ABNORMAL /HPF

## 2024-06-28 PROCEDURE — 99396 PREV VISIT EST AGE 40-64: CPT

## 2024-06-28 NOTE — PROGRESS NOTES
"Chief Complaint  Annual Exam    Subjective        Mike Suárez presents to [facility]  History of Present Illness  59-year-old male presenting for annual exam.  Has recently returned from a long weekend outside of [place] to visit his wife's family.  Diet and exercise have been about the same.  Frequently walks but is unable to perform weight lifting exercises due to shoulder conditions.  Has had repair of his right shoulder and has decreased range of motion of the left shoulder.  Also has bilateral knee and ankle issues.  Blood pressure at home prior to the visit was 122/79.  Frequently has whitecoat syndrome.  Denies headaches, chest pain, palpitations, difficulty breathing, shortness of breath, vision changes.    Sees dermatology annually due to previous basal cell removal from leg.  Has also had some precancerous spots on his face removed.      Objective   Vital Signs:  BP (!) 146/106 (BP Location: Right arm, Patient Position: Sitting, Cuff Size: Large Adult)   Pulse 73   Temp 98.6 °F (37 °C)   Ht 182.9 cm (72\")   Wt 108 kg (237 lb)   SpO2 96%   BMI 32.14 kg/m²   Estimated body mass index is 32.14 kg/m² as calculated from the following:    Height as of this encounter: 182.9 cm (72\").    Weight as of this encounter: 108 kg (237 lb).       BMI is >= 30 and <35. (Class 1 Obesity). The following options were offered after discussion;: exercise counseling/recommendations and nutrition counseling/recommendations      Physical Exam  Vitals reviewed.   Constitutional:       Appearance: Normal appearance.   HENT:      Head: Normocephalic.      Right Ear: Tympanic membrane normal.      Left Ear: Tympanic membrane normal.      Nose: Nose normal.      Mouth/Throat:      Mouth: Mucous membranes are moist.      Pharynx: Oropharynx is clear.   Eyes:      Pupils: Pupils are equal, round, and reactive to light.   Cardiovascular:      Rate and Rhythm: Normal rate.      Pulses: Normal " pulses.      Heart sounds: Normal heart sounds.   Pulmonary:      Effort: Pulmonary effort is normal.      Breath sounds: Normal breath sounds.   Abdominal:      General: Bowel sounds are normal.      Palpations: Abdomen is soft.   Musculoskeletal:         General: Normal range of motion.      Cervical back: Normal range of motion.   Skin:     General: Skin is warm and dry.      Capillary Refill: Capillary refill takes less than 2 seconds.   Neurological:      General: No focal deficit present.      Mental Status: He is alert and oriented to person, place, and time.   Psychiatric:         Mood and Affect: Mood normal.         Behavior: Behavior normal.         Thought Content: Thought content normal.         Judgment: Judgment normal.        Result Review :      Common labs          12/28/2023    13:39   Common Labs   Glucose 92    BUN 12    Creatinine 0.88    Sodium 142    Potassium 4.8    Chloride 101    Calcium 9.7    Total Protein 7.3    Albumin 5.0    Total Bilirubin 0.6    Alkaline Phosphatase 71    AST (SGOT) 14    ALT (SGPT) 21    WBC 5.52    Hemoglobin 15.4    Hematocrit 45.1    Platelets 213    Total Cholesterol 224    Triglycerides 74    HDL Cholesterol 68    LDL Cholesterol  143          Current Outpatient Medications on File Prior to Visit   Medication Sig Dispense Refill    diclofenac (CATAFLAM) 50 MG tablet Take 1 tablet by mouth Daily As Needed (mild pain). 90 tablet 1    levocetirizine (XYZAL) 5 MG tablet Take 1 tablet by mouth Every Evening.      Lutein-Zeaxanthin 15-4.75 MG capsule Take  by mouth Daily.      Multiple Vitamin (MULTI VITAMIN DAILY PO) Take 1 tablet by mouth.      Omega-3 Fatty Acids (fish oil) 1200 MG capsule capsule       polycarbophil (calcium polycarbophil) 625 MG tablet tablet       vitamin C (ASCORBIC ACID) 500 MG tablet Take 1 tablet by mouth Daily.       No current facility-administered medications on file prior to visit.                Assessment and Plan     Diagnoses and  all orders for this visit:    1. Screening for malignant neoplasm of prostate (Primary)  -     PSA Screen    2. Annual physical exam  -     Comprehensive Metabolic Panel  -     Urinalysis With Microscopic If Indicated (No Culture) - Urine, Clean Catch  -     Lipid Panel With / Chol / HDL Ratio  -     TSH Rfx On Abnormal To Free T4  -     CBC & Differential        Patient Instructions   Continue medications as prescribed. Recommend food journal for a week to make diet changes. Consider body weight exercises. Stay hydrated with water. Wear sunscreen. Labs today. Results to determine if statin to be started. Follow-up in 6 months for recheck and fasting labs.            Follow Up     Return in about 6 months (around 12/28/2024) for Recheck.  Patient was given instructions and counseling regarding his condition or for health maintenance advice. Please see specific information pulled into the AVS if appropriate.

## 2024-06-28 NOTE — PATIENT INSTRUCTIONS
Continue medications as prescribed. Recommend food journal for a week to make diet changes. Consider body weight exercises. Stay hydrated with water. Wear sunscreen. Labs today. Results to determine if statin to be started. Follow-up in 6 months for recheck and fasting labs.

## 2024-07-01 NOTE — PROGRESS NOTES
PSA screening is normal.    No signs of thyroid disease.    CBC is unremarkable.  No signs of infection or anemia.    Urinalysis unremarkable.  Stay hydrated with water to help maintain overall kidney health.    No signs of liver injury, kidney injury or electrolyte imbalance.  Recommend staying active and continuing to limit sweets and carbohydrates.    LDL (bad cholesterol) has increased slightly to 153.  Previous level 143. Recommend limiting intake of red meat, pork, fried foods and high fat dairy products. Limit intake of alcohol. Recommend at least 30 minutes of heart elevating exercises three days a week as tolerated.  Recommend starting atorvastatin 20 mg daily to help lower cholesterol levels.  If agreeable, prescription be sent to pharmacy.

## 2024-07-05 ENCOUNTER — TELEPHONE (OUTPATIENT)
Dept: INTERNAL MEDICINE | Facility: CLINIC | Age: 59
End: 2024-07-05
Payer: COMMERCIAL

## 2024-07-05 RX ORDER — ATORVASTATIN CALCIUM 20 MG/1
20 TABLET, FILM COATED ORAL DAILY
Qty: 90 TABLET | Refills: 0 | Status: SHIPPED | OUTPATIENT
Start: 2024-07-05

## 2024-07-05 NOTE — TELEPHONE ENCOUNTER
PCP is out of office and I am on-call provider. As covering provider I am refilling medication for patient's PCP.  Further refills need to be directed to patient's primary care provider.  1 refill granted at this time until seen by PCP  Thank you,  JOSEFINA Zimmer      Caller: Mike Suárez    Relationship: Self    Best call back number:     [phone] (Mobile)     What is the best time to reach you: ANYTIME, ASAP    Who are you requesting to speak with (clinical staff, provider,  specific staff member): CLINICAL STAFF/ NILSON ROCHA    Do you know the name of the person who called: Mike Suárez    What was the call regarding: PATIENT CALLED BACK REGARDING THE CALL HE RECEIVED TO ASK IF HE WAS AGREEABLE TO START TAKING A STATIN MEDICATION FOR HIS HIGH CHOLESTEROL LEVELS, PATIENT STATES HE IS AGREEABLE TO TAKING THE STATIN MEDICATION ATORVASTATIN 20MG AND WOULD LIKE IT SENT TO THE PHARMACY ASAP, HUB CONFIRMED PATIENT HAS NO PREVIOUS ALLERGIES OR NEGATIVE REACTIONS TO STATINS AND HAS NEVER TAKEN A STATIN BEFORE    [facility] [id number] [place] [id number] [phone] FX     PLEASE ADVISE PATIENT WHEN SENT TO PHARMACY ASAP    Is it okay if the provider responds through MyChart: PLEASE CALL PATIENT BACK REGARDING THIS ASAP

## 2024-07-08 ENCOUNTER — PATIENT MESSAGE (OUTPATIENT)
Dept: INTERNAL MEDICINE | Facility: CLINIC | Age: 59
End: 2024-07-08
Payer: COMMERCIAL

## 2024-07-12 NOTE — TELEPHONE ENCOUNTER
From: Mike Suárez  To: Robert Tiwari  Sent: 7/8/2024 12:15 PM EDT  Subject: Statin prescription     I have begun taking the prescribed statin, but am unsure whether to continue taking the fish oil supplement that I have been taking previously. I don’t think we discussed that or whether to begin taking a Co Q10 as well as the statin. Thanks!

## 2024-10-02 RX ORDER — ATORVASTATIN CALCIUM 20 MG/1
20 TABLET, FILM COATED ORAL DAILY
Qty: 90 TABLET | Refills: 0 | Status: SHIPPED | OUTPATIENT
Start: 2024-10-02

## 2024-12-31 RX ORDER — ATORVASTATIN CALCIUM 20 MG/1
20 TABLET, FILM COATED ORAL DAILY
Qty: 90 TABLET | Refills: 0 | Status: SHIPPED | OUTPATIENT
Start: 2024-12-31

## 2025-02-04 ENCOUNTER — TELEPHONE (OUTPATIENT)
Dept: INTERNAL MEDICINE | Facility: CLINIC | Age: 60
End: 2025-02-04

## 2025-02-04 NOTE — TELEPHONE ENCOUNTER
Hub staff attempted to follow warm transfer process and was unsuccessful     Caller: Mike Suárez    Relationship to patient: Self    Best call back number: [phone]     Patient is needing: TO RESCHEDULE PHYSICAL APPOINTMENT DUE TO THE LAST 2 APPOINTMENTS WERE CANCELLED BY PROVIDER.     PLEASE CALL TO ADVISE.

## 2025-02-04 NOTE — TELEPHONE ENCOUNTER
Called patient back to get his 6 month follow up rescheduled     Hub okay to reschedule patient with judith

## 2025-03-06 ENCOUNTER — OFFICE VISIT (OUTPATIENT)
Dept: INTERNAL MEDICINE | Facility: CLINIC | Age: 60
End: 2025-03-06
Payer: COMMERCIAL

## 2025-03-06 VITALS
HEIGHT: 72 IN | DIASTOLIC BLOOD PRESSURE: 87 MMHG | HEART RATE: 80 BPM | SYSTOLIC BLOOD PRESSURE: 139 MMHG | BODY MASS INDEX: 32.56 KG/M2 | WEIGHT: 240.4 LBS | TEMPERATURE: 98.2 F | OXYGEN SATURATION: 97 %

## 2025-03-06 DIAGNOSIS — G89.29 CHRONIC LEFT SHOULDER PAIN: ICD-10-CM

## 2025-03-06 DIAGNOSIS — E78.5 HYPERLIPIDEMIA, UNSPECIFIED HYPERLIPIDEMIA TYPE: Primary | ICD-10-CM

## 2025-03-06 DIAGNOSIS — I10 HYPERTENSION, UNSPECIFIED TYPE: ICD-10-CM

## 2025-03-06 DIAGNOSIS — M25.512 CHRONIC LEFT SHOULDER PAIN: ICD-10-CM

## 2025-03-06 LAB
ALBUMIN SERPL-MCNC: 4.6 G/DL (ref 3.5–5.2)
ALBUMIN/GLOB SERPL: 1.8 G/DL
ALP SERPL-CCNC: 80 U/L (ref 39–117)
ALT SERPL-CCNC: 21 U/L (ref 1–41)
APPEARANCE UR: CLEAR
AST SERPL-CCNC: 19 U/L (ref 1–40)
BILIRUB SERPL-MCNC: 0.5 MG/DL (ref 0–1.2)
BILIRUB UR QL STRIP: NEGATIVE
BUN SERPL-MCNC: 15 MG/DL (ref 8–23)
BUN/CREAT SERPL: 17 (ref 7–25)
CALCIUM SERPL-MCNC: 9.7 MG/DL (ref 8.6–10.5)
CHLORIDE SERPL-SCNC: 102 MMOL/L (ref 98–107)
CHOLEST SERPL-MCNC: 163 MG/DL (ref 0–200)
CHOLEST/HDLC SERPL: 2.36 {RATIO}
CO2 SERPL-SCNC: 26.6 MMOL/L (ref 22–29)
COLOR UR: YELLOW
CREAT SERPL-MCNC: 0.88 MG/DL (ref 0.76–1.27)
EGFRCR SERPLBLD CKD-EPI 2021: 98.4 ML/MIN/1.73
GLOBULIN SER CALC-MCNC: 2.5 GM/DL
GLUCOSE SERPL-MCNC: 106 MG/DL (ref 65–99)
GLUCOSE UR QL STRIP: NEGATIVE
HDLC SERPL-MCNC: 69 MG/DL (ref 40–60)
HGB UR QL STRIP: NEGATIVE
KETONES UR QL STRIP: NEGATIVE
LDLC SERPL CALC-MCNC: 79 MG/DL (ref 0–100)
LEUKOCYTE ESTERASE UR QL STRIP: NEGATIVE
NITRITE UR QL STRIP: NEGATIVE
PH UR STRIP: 6 [PH] (ref 5–8)
POTASSIUM SERPL-SCNC: 5 MMOL/L (ref 3.5–5.2)
PROT SERPL-MCNC: 7.1 G/DL (ref 6–8.5)
PROT UR QL STRIP: NEGATIVE
SODIUM SERPL-SCNC: 141 MMOL/L (ref 136–145)
SP GR UR STRIP: 1.01 (ref 1–1.03)
TRIGL SERPL-MCNC: 78 MG/DL (ref 0–150)
UROBILINOGEN UR STRIP-MCNC: NORMAL MG/DL
VLDLC SERPL CALC-MCNC: 15 MG/DL (ref 5–40)

## 2025-03-06 PROCEDURE — 99213 OFFICE O/P EST LOW 20 MIN: CPT

## 2025-03-06 RX ORDER — ATORVASTATIN CALCIUM 20 MG/1
20 TABLET, FILM COATED ORAL DAILY
Qty: 90 TABLET | Refills: 1 | Status: SHIPPED | OUTPATIENT
Start: 2025-03-06

## 2025-03-06 NOTE — PATIENT INSTRUCTIONS
Continue medications as prescribed. Referral to Dr. Juan ordered. Scheduling center to call with appointment. Labs today. Follow-up in 6 months for annual and fasting labs.

## 2025-03-06 NOTE — PROGRESS NOTES
"Chief Complaint  Med Refill    Subjective        Mike Suárez presents to [facility]  History of Present Illness  59-year-old male presenting for left shoulder pain, hyperlipidemia and hypertension.  Has been taking atorvastatin 20 mg daily.  Tolerating well.  Has gained some weight due to snacking during sporting events.  Is enjoying [place] basketball this season.    Has chronic left shoulder issues.  His range of motion is decreased.  States he does get sharp stabbing pains if he over reaches.  This has been going on for years and has had injections in the past by Dr. Roche at [facility].  Had his right shoulder replaced by Dr. Roche at [facility] multiple years ago.  Has not seen him for multiple years and new referral is needed.      Is considering entering into a program called [employer] through his employer that may assist with surgery benefits.  Will assist in any ways necessary.    Has not been regularly checking his blood pressure at home.  Denies chest pain, headaches, difficulty breathing, shortness of breath, swelling of lower extremities.      Objective   Vital Signs:  /87 (BP Location: Left arm, Patient Position: Sitting, Cuff Size: Large Adult)   Pulse 80   Temp 98.2 °F (36.8 °C) (Temporal)   Ht 182.9 cm (72.01\")   Wt 109 kg (240 lb 6.4 oz)   SpO2 97%   BMI 32.60 kg/m²   Estimated body mass index is 32.6 kg/m² as calculated from the following:    Height as of this encounter: 182.9 cm (72.01\").    Weight as of this encounter: 109 kg (240 lb 6.4 oz).               Physical Exam  Vitals reviewed.   Constitutional:       Appearance: Normal appearance.   Musculoskeletal:         General: Normal range of motion.      Cervical back: Normal range of motion.   Skin:     General: Skin is warm and dry.      Capillary Refill: Capillary refill takes less than 2 seconds.   Neurological:      General: No focal deficit present.      Mental Status: He is alert and " oriented to person, place, and time.   Psychiatric:         Mood and Affect: Mood normal.         Behavior: Behavior normal.         Thought Content: Thought content normal.         Judgment: Judgment normal.        Result Review :      Common labs          6/28/2024    10:38   Common Labs   Glucose 100    BUN 11    Creatinine 0.88    Sodium 143    Potassium 4.4    Chloride 102    Calcium 9.8    Albumin 4.9    Total Bilirubin 0.6    Alkaline Phosphatase 61    AST (SGOT) 18    ALT (SGPT) 18    WBC 5.31    Hemoglobin 16.6    Hematocrit 48.4    Platelets 217    Total Cholesterol 239    Triglycerides 71    HDL Cholesterol 74    LDL Cholesterol  153    PSA 1.100          Current Outpatient Medications on File Prior to Visit   Medication Sig Dispense Refill    diclofenac (CATAFLAM) 50 MG tablet Take 1 tablet by mouth Daily As Needed (mild pain). 90 tablet 1    levocetirizine (XYZAL) 5 MG tablet Take 1 tablet by mouth Every Evening.      Lutein-Zeaxanthin 15-4.75 MG capsule Take  by mouth Daily.      Multiple Vitamin (MULTI VITAMIN DAILY PO) Take 1 tablet by mouth.      polycarbophil (calcium polycarbophil) 625 MG tablet tablet       vitamin C (ASCORBIC ACID) 500 MG tablet Take 1 tablet by mouth Daily.      [DISCONTINUED] atorvastatin (LIPITOR) 20 MG tablet TAKE 1 TABLET BY MOUTH EVERY DAY 90 tablet 0    [DISCONTINUED] Omega-3 Fatty Acids (fish oil) 1200 MG capsule capsule        No current facility-administered medications on file prior to visit.                Assessment and Plan     Diagnoses and all orders for this visit:    1. Hyperlipidemia, unspecified hyperlipidemia type (Primary)  -     atorvastatin (LIPITOR) 20 MG tablet; Take 1 tablet by mouth Daily.  Dispense: 90 tablet; Refill: 1  -     Lipid Panel With / Chol / HDL Ratio    2. Hypertension, unspecified type  -     Comprehensive Metabolic Panel  -     Urinalysis With Microscopic If Indicated (No Culture) - Urine, Clean Catch    3. Chronic left shoulder pain  -      Ambulatory Referral to Orthopedic Surgery        Patient Instructions   Continue medications as prescribed. Referral to Dr. Juan ordered. Scheduling center to call with appointment. Labs today. Follow-up in 6 months for annual and fasting labs.            Follow Up     Return in about 6 months (around 9/6/2025) for Annual physical.  Patient was given instructions and counseling regarding his condition or for health maintenance advice. Please see specific information pulled into the AVS if appropriate.

## 2025-03-07 NOTE — PROGRESS NOTES
Urinalysis is clear.    Cholesterol levels look great.  Continue atorvastatin 20 mg daily.    Metabolic panel is unremarkable.  Blood glucose is slightly elevated.  Recommend staying hydrated water and limiting sweets and carbohydrates.  Follow-up in 6 months as scheduled for fasting labs.

## (undated) DEVICE — TUBING, SUCTION, 1/4" X 10', STRAIGHT: Brand: MEDLINE

## (undated) DEVICE — SINGLE-USE BIOPSY FORCEPS: Brand: RADIAL JAW 4

## (undated) DEVICE — CANN O2 ETCO2 FITS ALL CONN CO2 SMPL A/ 7IN DISP LF

## (undated) DEVICE — ADAPT CLN BIOGUARD AIR/H2O DISP

## (undated) DEVICE — SENSR O2 OXIMAX FNGR A/ 18IN NONSTR

## (undated) DEVICE — THE SINGLE USE ETRAP – POLYP TRAP IS USED FOR SUCTION RETRIEVAL OF ENDOSCOPICALLY REMOVED POLYPS.: Brand: ETRAP

## (undated) DEVICE — LN SMPL CO2 SHTRM SD STREAM W/M LUER

## (undated) DEVICE — SNAR POLYP CAPTIVATOR RND STFF 2.4 240CM 10MM 1P/U

## (undated) DEVICE — KT ORCA ORCAPOD DISP STRL